# Patient Record
Sex: FEMALE | Race: WHITE | URBAN - METROPOLITAN AREA
[De-identification: names, ages, dates, MRNs, and addresses within clinical notes are randomized per-mention and may not be internally consistent; named-entity substitution may affect disease eponyms.]

---

## 2017-01-31 ENCOUNTER — TELEPHONE (OUTPATIENT)
Dept: DERMATOLOGY | Age: 52
End: 2017-01-31

## 2017-05-04 ENCOUNTER — OFFICE VISIT (OUTPATIENT)
Dept: DERMATOLOGY | Age: 52
End: 2017-05-04

## 2017-05-04 DIAGNOSIS — L81.4 SOLAR LENTIGO: ICD-10-CM

## 2017-05-04 DIAGNOSIS — L82.1 SK (SEBORRHEIC KERATOSIS): ICD-10-CM

## 2017-05-04 DIAGNOSIS — L57.0 AK (ACTINIC KERATOSIS): Primary | ICD-10-CM

## 2017-05-04 DIAGNOSIS — L28.1 PRURIGO NODULARIS: ICD-10-CM

## 2017-05-04 PROCEDURE — 11900 INJECT SKIN LESIONS </W 7: CPT | Performed by: DERMATOLOGY

## 2017-05-04 PROCEDURE — 17000 DESTRUCT PREMALG LESION: CPT | Performed by: DERMATOLOGY

## 2017-05-04 PROCEDURE — 99213 OFFICE O/P EST LOW 20 MIN: CPT | Performed by: DERMATOLOGY

## 2017-07-05 ENCOUNTER — TELEPHONE (OUTPATIENT)
Dept: DERMATOLOGY | Age: 52
End: 2017-07-05

## 2017-07-07 ENCOUNTER — OFFICE VISIT (OUTPATIENT)
Dept: DERMATOLOGY | Age: 52
End: 2017-07-07

## 2017-07-07 DIAGNOSIS — L28.1 PRURIGO NODULARIS: Primary | ICD-10-CM

## 2017-07-07 PROCEDURE — 11900 INJECT SKIN LESIONS </W 7: CPT | Performed by: DERMATOLOGY

## 2020-01-14 ENCOUNTER — OFFICE VISIT (OUTPATIENT)
Dept: DERMATOLOGY | Age: 55
End: 2020-01-14
Payer: COMMERCIAL

## 2020-01-14 PROCEDURE — 11900 INJECT SKIN LESIONS </W 7: CPT | Performed by: DERMATOLOGY

## 2020-01-14 RX ORDER — FLUTICASONE PROPIONATE 50 MCG
2 SPRAY, SUSPENSION (ML) NASAL
COMMUNITY
Start: 2017-05-02

## 2020-01-14 RX ORDER — VALACYCLOVIR HYDROCHLORIDE 1 G/1
TABLET, FILM COATED ORAL
COMMUNITY
Start: 2019-03-11

## 2020-01-14 RX ORDER — BISOPROLOL FUMARATE AND HYDROCHLOROTHIAZIDE 2.5; 6.25 MG/1; MG/1
1 TABLET ORAL
COMMUNITY
Start: 2019-09-17

## 2020-01-14 RX ORDER — ALBUTEROL SULFATE 90 UG/1
AEROSOL, METERED RESPIRATORY (INHALATION)
COMMUNITY
Start: 2014-12-15

## 2020-01-14 RX ORDER — LISDEXAMFETAMINE DIMESYLATE 60 MG/1
CAPSULE ORAL
COMMUNITY
Start: 2020-01-05

## 2020-01-14 NOTE — PROGRESS NOTES
[facility]  Jose Del Toro MD  [phone]      Blair Speak  1965    53 y.o. female     Date of Visit: 2020    Chief Complaint: skin lesions    History of Present Illness:    She complains of persistent pruritic and sometimes tender lesions on the right calf and left buttock. She has a history of prurigo nodularis. Improved with intralesional Kenalog in the past.    Review of Systems:  None. Past Medical History, Family History, Surgical History, Medications and Allergies reviewed. Past Medical History:   Diagnosis Date    Anxiety and depression     Asthma     Hypertension     Stress incontinence      Past Surgical History:   Procedure Laterality Date     SECTION      x2    COLONOSCOPY      OTHER SURGICAL HISTORY N/A 3/10/15    uterine ablation, trans vaginal taping       No Known Allergies  Outpatient Medications Marked as Taking for the 20 encounter (Office Visit) with Lennox Eaves, MD   Medication Sig Dispense Refill    albuterol sulfate  (90 Base) MCG/ACT inhaler Every 4 hrs prn      bisoprolol-hydrochlorothiazide (ZIAC) 2.5-6.25 MG per tablet Take 1 tablet by mouth      fluticasone (FLONASE) 50 MCG/ACT nasal spray 2 sprays by Nasal route      VYVANSE 60 MG CAPS       valACYclovir (VALTREX) 1 g tablet Take 2 tabs every 12 hrs at onset of coldsore      montelukast (SINGULAIR) 10 MG tablet Take 10 mg by mouth      mometasone-formoterol (DULERA) 100-5 MCG/ACT inhaler Inhale 2 puffs into the lungs daily. Physical Examination       Well appearing. 1.  Left calf -crusted pink-brown papule. Left buttock with an indurated pink brown papule. Assessment and Plan     1. Prurigo nodularis - 2    Intralesional Kenalog 10 mg/mL- 0.2 mL injected into 1 lesion on the left calf and one on the left buttock.

## 2020-02-28 ENCOUNTER — TELEPHONE (OUTPATIENT)
Dept: DERMATOLOGY | Age: 55
End: 2020-02-28

## 2020-02-28 NOTE — TELEPHONE ENCOUNTER
I called to confirm patient appointment for Monday. She said that she was in in January and wants to know if it is too soon for her to come in on Monday or should she come in? The spot on her leg has not gone away.     Call back #  [phone]

## 2020-03-02 ENCOUNTER — OFFICE VISIT (OUTPATIENT)
Dept: DERMATOLOGY | Age: 55
End: 2020-03-02
Payer: COMMERCIAL

## 2020-03-02 PROCEDURE — 11900 INJECT SKIN LESIONS </W 7: CPT | Performed by: DERMATOLOGY

## 2020-03-02 PROCEDURE — 11102 TANGNTL BX SKIN SINGLE LES: CPT | Performed by: DERMATOLOGY

## 2020-03-02 RX ORDER — TRIAMCINOLONE ACETONIDE 40 MG/ML
2 INJECTION, SUSPENSION INTRA-ARTICULAR; INTRAMUSCULAR ONCE
Status: COMPLETED | OUTPATIENT
Start: 2020-03-02 | End: 2020-03-02

## 2020-03-02 RX ADMIN — TRIAMCINOLONE ACETONIDE 2 MG: 40 INJECTION, SUSPENSION INTRA-ARTICULAR; INTRAMUSCULAR at 13:45

## 2020-03-02 NOTE — PATIENT INSTRUCTIONS

## 2020-03-02 NOTE — PROGRESS NOTES
[facility]  Alejandro Hardin MD  [phone]      [place]  1965    53 y.o. female     Date of Visit: 3/2/2020    Chief Complaint: skin lesions    History of Present Illness:    1. Follow up for prurigo nodules on the right buttock and right calf. Right buttock lesion resolved with intralesional Kenalog. Nodule on the right calf is better but persists. 2.  She also complains of a lesion on the right nasal ala. Review of Systems:  None. Past Medical History, Family History, Surgical History, Medications and Allergies reviewed. Past Medical History:   Diagnosis Date    Anxiety and depression     Asthma     Hypertension     Stress incontinence      Past Surgical History:   Procedure Laterality Date     SECTION      x2    COLONOSCOPY      OTHER SURGICAL HISTORY N/A 3/10/15    uterine ablation, trans vaginal taping       No Known Allergies  Outpatient Medications Marked as Taking for the 3/2/20 encounter (Office Visit) with Darryl Singer MD   Medication Sig Dispense Refill    albuterol sulfate  (90 Base) MCG/ACT inhaler Every 4 hrs prn      bisoprolol-hydrochlorothiazide (ZIAC) 2.5-6.25 MG per tablet Take 1 tablet by mouth      fluticasone (FLONASE) 50 MCG/ACT nasal spray 2 sprays by Nasal route      VYVANSE 60 MG CAPS       valACYclovir (VALTREX) 1 g tablet Take 2 tabs every 12 hrs at onset of coldsore      mometasone-formoterol (DULERA) 100-5 MCG/ACT inhaler Inhale 2 puffs into the lungs daily. Physical Examination       Well appearing. 1.  Right buttock clear. Right calf with an indurated crusted pink-brown papule. 2.  Right superior nasolabial fold with a 4 mm telangiectatic skin colored papule. Assessment and Plan     1. Prurigo nodularis - improved    Intralesional Kenalog 20 mg/mL- 0.1 mL injected into the lesion remaining on the right calf.      2. Neoplasm of uncertain behavior of skin, right

## 2020-03-04 LAB — DERMATOLOGY PATHOLOGY REPORT: NORMAL

## 2020-06-16 ENCOUNTER — TELEPHONE (OUTPATIENT)
Dept: DERMATOLOGY | Age: 55
End: 2020-06-16

## 2020-06-18 ENCOUNTER — OFFICE VISIT (OUTPATIENT)
Dept: DERMATOLOGY | Age: 55
End: 2020-06-18
Payer: COMMERCIAL

## 2020-06-18 PROCEDURE — 11900 INJECT SKIN LESIONS </W 7: CPT | Performed by: DERMATOLOGY

## 2020-06-18 RX ORDER — CLOBETASOL PROPIONATE 0.5 MG/G
CREAM TOPICAL
Qty: 30 G | Refills: 0 | Status: SHIPPED | OUTPATIENT
Start: 2020-06-18

## 2020-06-18 RX ORDER — TRIAMCINOLONE ACETONIDE 40 MG/ML
2 INJECTION, SUSPENSION INTRA-ARTICULAR; INTRAMUSCULAR ONCE
Status: COMPLETED | OUTPATIENT
Start: 2020-06-18 | End: 2020-06-19

## 2020-06-19 ENCOUNTER — TELEPHONE (OUTPATIENT)
Dept: DERMATOLOGY | Age: 55
End: 2020-06-19

## 2020-06-19 RX ADMIN — TRIAMCINOLONE ACETONIDE 2 MG: 40 INJECTION, SUSPENSION INTRA-ARTICULAR; INTRAMUSCULAR at 11:44

## 2021-03-01 ENCOUNTER — OFFICE VISIT (OUTPATIENT)
Dept: DERMATOLOGY | Age: 56
End: 2021-03-01
Payer: COMMERCIAL

## 2021-03-01 VITALS — TEMPERATURE: 98.9 F

## 2021-03-01 DIAGNOSIS — L28.1 PRURIGO NODULARIS: Primary | ICD-10-CM

## 2021-03-01 PROCEDURE — 11900 INJECT SKIN LESIONS </W 7: CPT | Performed by: DERMATOLOGY

## 2021-03-01 RX ORDER — TRIAMCINOLONE ACETONIDE 40 MG/ML
2 INJECTION, SUSPENSION INTRA-ARTICULAR; INTRAMUSCULAR ONCE
Status: COMPLETED | OUTPATIENT
Start: 2021-03-01 | End: 2021-03-01

## 2021-03-01 RX ADMIN — TRIAMCINOLONE ACETONIDE 2 MG: 40 INJECTION, SUSPENSION INTRA-ARTICULAR; INTRAMUSCULAR at 08:53

## 2021-03-01 NOTE — PROGRESS NOTES
[facility]  Jaz Shi MD  [phone]      Cony Adams  1965    63 y.o. female     Date of Visit: 3/1/2021    Chief Complaint: prurigo nodularis    History of Present Illness:    She returns today to follow-up for a prurigo nodule on the left buttock. It has improved with intralesional Kenalog injections but has subsequently recurred. Review of Systems:  Gen: Feels well, good sense of health. Past Medical History, Family History, Surgical History, Medications and Allergies reviewed. Past Medical History:   Diagnosis Date    Anxiety and depression     Asthma     Hypertension     Stress incontinence      Past Surgical History:   Procedure Laterality Date     SECTION      x2    COLONOSCOPY      OTHER SURGICAL HISTORY N/A 3/10/15    uterine ablation, trans vaginal taping       No Known Allergies  Outpatient Medications Marked as Taking for the 3/1/21 encounter (Office Visit) with Marisabel Liriano MD   Medication Sig Dispense Refill    clobetasol (TEMOVATE) 0.05 % cream Apply to affected areas twice daily until improved. 30 g 0    albuterol sulfate  (90 Base) MCG/ACT inhaler Every 4 hrs prn      bisoprolol-hydrochlorothiazide (ZIAC) 2.5-6.25 MG per tablet Take 1 tablet by mouth      fluticasone (FLONASE) 50 MCG/ACT nasal spray 2 sprays by Nasal route      VYVANSE 60 MG CAPS       valACYclovir (VALTREX) 1 g tablet Take 2 tabs every 12 hrs at onset of coldsore      mometasone-formoterol (DULERA) 100-5 MCG/ACT inhaler Inhale 2 puffs into the lungs daily. Physical Examination       Well-appearing. 1.  Left medial buttock lower buttock with a linear hyperpigmented scar, laterally with an indurated excoriated pink nodule. Assessment and Plan     1. Prurigo nodularis of the left buttock -     Intralesional Kenalog 20 mg/mL - 0.1 mL injected into 1 nodule on the left buttock.     Clobetasol cream if needed for recurrence.           --Valentín England MD

## 2022-03-01 ENCOUNTER — OFFICE VISIT (OUTPATIENT)
Dept: DERMATOLOGY | Age: 57
End: 2022-03-01
Payer: COMMERCIAL

## 2022-03-01 VITALS — TEMPERATURE: 98.1 F

## 2022-03-01 DIAGNOSIS — L29.9 SCALP PRURITUS: Primary | ICD-10-CM

## 2022-03-01 PROCEDURE — 99213 OFFICE O/P EST LOW 20 MIN: CPT | Performed by: DERMATOLOGY

## 2022-03-01 RX ORDER — KETOCONAZOLE 20 MG/ML
SHAMPOO TOPICAL
Qty: 120 ML | Refills: 5 | Status: SHIPPED | OUTPATIENT
Start: 2022-03-01 | End: 2022-10-24 | Stop reason: SDUPTHER

## 2022-03-01 NOTE — PROGRESS NOTES
[facility]  Colby Bustillos MD  [phone]      Lang Fish  1965    61 y.o. female     Date of Visit: 3/1/2022    Chief Complaint: skin lesions    History of Present Illness:    1. She complains of painful more than itchy lesions on the scalp. Has been an issue for the past 6 months. Has worsened over the colder weather months. Seeing some improvement with use of Head and Shoulders shampoo. Review of Systems:  Gen: Feels well, good sense of health. Skin: No new or changing mole. Past Medical History, Family History, Surgical History, Medications and Allergies reviewed. Past Medical History:   Diagnosis Date    Anxiety and depression     Asthma     Hypertension     Stress incontinence      Past Surgical History:   Procedure Laterality Date     SECTION      x2    COLONOSCOPY      OTHER SURGICAL HISTORY N/A 3/10/15    uterine ablation, trans vaginal taping       No Known Allergies  Outpatient Medications Marked as Taking for the 3/1/22 encounter (Office Visit) with Gloria De La Cruz MD   Medication Sig Dispense Refill    ketoconazole (NIZORAL) 2 % shampoo Use to wash the scalp 3 times weekly. Leave on the scalp for 5 minutes prior to rinsing. 120 mL 5    albuterol sulfate  (90 Base) MCG/ACT inhaler Every 4 hrs prn      bisoprolol-hydrochlorothiazide (ZIAC) 2.5-6.25 MG per tablet Take 1 tablet by mouth      fluticasone (FLONASE) 50 MCG/ACT nasal spray 2 sprays by Nasal route      VYVANSE 60 MG CAPS       valACYclovir (VALTREX) 1 g tablet Take 2 tabs every 12 hrs at onset of coldsore      mometasone-formoterol (DULERA) 100-5 MCG/ACT inhaler Inhale 2 puffs into the lungs daily. Physical Examination       The following were examined and determined to be normal: Psych/Neuro, Head/face and Neck. The following were examined and determined to be abnormal: Scalp/hair. Well appearing.      1.  Scalp with several round crusted excoriations. Assessment and Plan     1. Scalp pruritus and pain - ? underlying seborrheic dermatitis    Keep nails trimmed. Nizoral shampoo 3 times weekly. Instructed to leave on the scalp for 5 minutes prior to washing off. Continue Lidex solution in the future if needed.          --Madalyn Dickens MD

## 2022-03-17 ENCOUNTER — PATIENT MESSAGE (OUTPATIENT)
Dept: DERMATOLOGY | Age: 57
End: 2022-03-17

## 2022-03-17 NOTE — TELEPHONE ENCOUNTER
From: Brandon Siddiqui  To: Dr. Dorian Taylor  Sent: 3/17/2022 11:17 AM EDT  Subject: Scalp Issue    The shampoo is helping, but not on the worst parts. Is there a topical available? Thank you!

## 2022-04-04 RX ORDER — FLUOCINONIDE TOPICAL SOLUTION USP, 0.05% 0.5 MG/ML
SOLUTION TOPICAL
Qty: 60 ML | Refills: 3 | Status: SHIPPED | OUTPATIENT
Start: 2022-04-04 | End: 2022-10-21

## 2022-10-21 RX ORDER — FLUOCINONIDE TOPICAL SOLUTION USP, 0.05% 0.5 MG/ML
SOLUTION TOPICAL
Qty: 60 ML | Refills: 3 | Status: SHIPPED | OUTPATIENT
Start: 2022-10-21

## 2022-10-24 RX ORDER — KETOCONAZOLE 20 MG/ML
SHAMPOO TOPICAL
Qty: 120 ML | Refills: 5 | Status: SHIPPED | OUTPATIENT
Start: 2022-10-24

## 2023-07-17 ENCOUNTER — PATIENT MESSAGE (OUTPATIENT)
Dept: DERMATOLOGY | Age: 58
End: 2023-07-17

## 2024-05-13 ENCOUNTER — TELEPHONE (OUTPATIENT)
Dept: DERMATOLOGY | Age: 59
End: 2024-05-13

## 2024-05-15 ENCOUNTER — OFFICE VISIT (OUTPATIENT)
Dept: DERMATOLOGY | Age: 59
End: 2024-05-15
Payer: COMMERCIAL

## 2024-05-15 DIAGNOSIS — L82.1 SK (SEBORRHEIC KERATOSIS): Primary | ICD-10-CM

## 2024-05-15 PROCEDURE — 99212 OFFICE O/P EST SF 10 MIN: CPT | Performed by: DERMATOLOGY

## 2024-05-15 NOTE — PROGRESS NOTES
[facility]  Rodriguez Eckert MD  [phone]      Jadyn Vazquez  1965    58 y.o. female     Date of Visit: 5/15/2024    Chief Complaint: skin lesion    History of Present Illness:    She today for a recently noted changing lesion on the right anterior medial thigh.      Review of Systems:  Gen: Feels well, good sense of health.    Past Medical History, Family History, Surgical History, Medications and Allergies reviewed.    Past Medical History:   Diagnosis Date    Anxiety and depression     Asthma     Hypertension     Stress incontinence      Past Surgical History:   Procedure Laterality Date     SECTION      x2    COLONOSCOPY      OTHER SURGICAL HISTORY N/A 3/10/15    uterine ablation, trans vaginal taping       No Known Allergies  Outpatient Medications Marked as Taking for the 5/15/24 encounter (Office Visit) with Rodriguez Eckert MD   Medication Sig Dispense Refill    ketoconazole (NIZORAL) 2 % shampoo Use to wash the scalp 3 times weekly.  Leave on the scalp for 5 minutes prior to rinsing. 120 mL 5    fluocinonide (LIDEX) 0.05 % external solution APPLY TO THE AFFECTED AREAS OF THE SCALP DAILY AS NEEDED UNTIL IMPROVED 60 mL 3    clobetasol (TEMOVATE) 0.05 % cream Apply to affected areas twice daily until improved. 30 g 0    albuterol sulfate  (90 Base) MCG/ACT inhaler Every 4 hrs prn      bisoprolol-hydrochlorothiazide (ZIAC) 2.5-6.25 MG per tablet Take 1 tablet by mouth      fluticasone (FLONASE) 50 MCG/ACT nasal spray 2 sprays by Nasal route      VYVANSE 60 MG CAPS       valACYclovir (VALTREX) 1 g tablet Take 2 tabs every 12 hrs at onset of coldsore      mometasone-formoterol (DULERA) 100-5 MCG/ACT inhaler Inhale 2 puffs into the lungs daily           Physical Examination       Well appearing.    1.  Right distal anterior medial thigh with a stuck on appearing well-defined round oval slightly scaly brown patch.        Assessment and Plan     1. Flat SK